# Patient Record
Sex: FEMALE | Race: WHITE | NOT HISPANIC OR LATINO
[De-identification: names, ages, dates, MRNs, and addresses within clinical notes are randomized per-mention and may not be internally consistent; named-entity substitution may affect disease eponyms.]

---

## 2021-01-23 PROBLEM — Z00.00 ENCOUNTER FOR PREVENTIVE HEALTH EXAMINATION: Status: ACTIVE | Noted: 2021-01-23

## 2021-09-16 ENCOUNTER — APPOINTMENT (OUTPATIENT)
Dept: MRI IMAGING | Facility: CLINIC | Age: 76
End: 2021-09-16
Payer: MEDICARE

## 2021-09-16 PROCEDURE — 73721 MRI JNT OF LWR EXTRE W/O DYE: CPT | Mod: RT,MH

## 2021-11-02 ENCOUNTER — OUTPATIENT (OUTPATIENT)
Dept: OUTPATIENT SERVICES | Facility: HOSPITAL | Age: 76
LOS: 1 days | End: 2021-11-02

## 2021-11-13 ENCOUNTER — APPOINTMENT (OUTPATIENT)
Dept: DISASTER EMERGENCY | Facility: CLINIC | Age: 76
End: 2021-11-13

## 2021-11-13 DIAGNOSIS — Z01.818 ENCOUNTER FOR OTHER PREPROCEDURAL EXAMINATION: ICD-10-CM

## 2021-11-14 LAB — SARS-COV-2 N GENE NPH QL NAA+PROBE: NOT DETECTED

## 2021-11-16 ENCOUNTER — INPATIENT (INPATIENT)
Facility: HOSPITAL | Age: 76
LOS: 1 days | Discharge: HOME HEALTH | End: 2021-11-18

## 2021-11-16 ENCOUNTER — OUTPATIENT (OUTPATIENT)
Dept: OUTPATIENT SERVICES | Facility: HOSPITAL | Age: 76
LOS: 1 days | End: 2021-11-16

## 2021-11-17 ENCOUNTER — OUTPATIENT (OUTPATIENT)
Dept: OUTPATIENT SERVICES | Facility: HOSPITAL | Age: 76
LOS: 1 days | End: 2021-11-17

## 2021-11-18 ENCOUNTER — OUTPATIENT (OUTPATIENT)
Dept: OUTPATIENT SERVICES | Facility: HOSPITAL | Age: 76
LOS: 1 days | End: 2021-11-18

## 2022-06-30 ENCOUNTER — APPOINTMENT (OUTPATIENT)
Dept: MRI IMAGING | Facility: CLINIC | Age: 77
End: 2022-06-30

## 2022-06-30 PROCEDURE — 72148 MRI LUMBAR SPINE W/O DYE: CPT | Mod: MH

## 2022-12-14 ENCOUNTER — OFFICE (OUTPATIENT)
Dept: URBAN - METROPOLITAN AREA CLINIC 97 | Facility: CLINIC | Age: 77
Setting detail: OPHTHALMOLOGY
End: 2022-12-14
Payer: MEDICARE

## 2022-12-14 DIAGNOSIS — H18.513: ICD-10-CM

## 2022-12-14 DIAGNOSIS — H01.005: ICD-10-CM

## 2022-12-14 DIAGNOSIS — H35.373: ICD-10-CM

## 2022-12-14 DIAGNOSIS — Z96.1: ICD-10-CM

## 2022-12-14 DIAGNOSIS — H01.001: ICD-10-CM

## 2022-12-14 DIAGNOSIS — H01.004: ICD-10-CM

## 2022-12-14 DIAGNOSIS — H01.002: ICD-10-CM

## 2022-12-14 DIAGNOSIS — H43.393: ICD-10-CM

## 2022-12-14 DIAGNOSIS — H16.223: ICD-10-CM

## 2022-12-14 DIAGNOSIS — H26.493: ICD-10-CM

## 2022-12-14 PROCEDURE — 92014 COMPRE OPH EXAM EST PT 1/>: CPT | Performed by: OPHTHALMOLOGY

## 2022-12-14 PROCEDURE — 92202 OPSCPY EXTND ON/MAC DRAW: CPT | Performed by: OPHTHALMOLOGY

## 2022-12-14 ASSESSMENT — SPHEQUIV_DERIVED
OS_SPHEQUIV: -0.125
OS_SPHEQUIV: -0.125
OD_SPHEQUIV: 0.625
OD_SPHEQUIV: 0.75
OS_SPHEQUIV: 0
OD_SPHEQUIV: 0.375

## 2022-12-14 ASSESSMENT — REFRACTION_MANIFEST
OS_VA1: 20/25
OS_VA2: 20/25(J1)
OD_SPHERE: +0.50
OD_VA2: 20/25(J1)
OD_AXIS: 150
OS_SPHERE: -0.50
OS_CYLINDER: +0.75
OS_ADD: +2.75
OS_CYLINDER: +1.00
OD_CYLINDER: +0.25
OD_ADD: +2.75
OS_AXIS: 025
OD_ADD: +3.00
OU_VA: 20/20
OD_CYLINDER: +0.50
OD_VA1: 20/25
OD_VA1: 20/25
OD_AXIS: 150
OD_VA2: 20/25(J1)
OS_SPHERE: -0.50
OS_VA1: 20/25
OS_VA2: 20/25(J1)
OD_SPHERE: +0.50
OS_ADD: +3.00
OS_AXIS: 025
OU_VA: 20/20

## 2022-12-14 ASSESSMENT — REFRACTION_CURRENTRX
OS_VPRISM_DIRECTION: SV
OS_CYLINDER: +0.25
OD_SPHERE: -0.25
OS_SPHERE: -0.50
OS_SPHERE: +2.75
OS_OVR_VA: 20/
OD_VPRISM_DIRECTION: PROGS
OD_CYLINDER: SPHERE
OD_SPHERE: +2.75
OD_ADD: +2.75
OD_CYLINDER: SPH
OD_OVR_VA: 20/
OS_OVR_VA: 20/
OD_VPRISM_DIRECTION: SV
OS_CYLINDER: SPH
OS_AXIS: 022
OD_OVR_VA: 20/
OS_VPRISM_DIRECTION: PROGS
OS_ADD: +2.75

## 2022-12-14 ASSESSMENT — LID EXAM ASSESSMENTS
OS_COMMENTS: 2+ MEDIAL
OD_COMMENTS: 1+ MEDIAL
OD_BLEPHARITIS: RLL RUL 1+
OD_COMMENTS: 1+ LATERAL
OS_COMMENTS: 2+ LATERAL
OD_FRONTALIS_USE: 1+
OS_BLEPHARITIS: LLL LUL 1+
OS_FRONTALIS_USE: 2+

## 2022-12-14 ASSESSMENT — TONOMETRY
OS_IOP_MMHG: 16
OD_IOP_MMHG: 16

## 2022-12-14 ASSESSMENT — AXIALLENGTH_DERIVED
OS_AL: 23.4124
OD_AL: 23.306
OD_AL: 23.2586
OS_AL: 23.4124
OS_AL: 23.3645
OD_AL: 23.4014

## 2022-12-14 ASSESSMENT — REFRACTION_AUTOREFRACTION
OD_SPHERE: +0.25
OS_SPHERE: -0.50
OS_CYLINDER: +0.75
OS_AXIS: 28
OD_AXIS: 148
OD_CYLINDER: +0.25

## 2022-12-14 ASSESSMENT — LID POSITION - PTOSIS
OS_PTOSIS: LUL T
OD_PTOSIS: RUL T

## 2022-12-14 ASSESSMENT — CONFRONTATIONAL VISUAL FIELD TEST (CVF)
OS_FINDINGS: FULL
OD_FINDINGS: FULL

## 2022-12-14 ASSESSMENT — LID POSITION - DERMATOCHALASIS
OD_DERMATOCHALASIS: RUL T
OS_DERMATOCHALASIS: LUL T

## 2022-12-14 ASSESSMENT — KERATOMETRY
OS_K1POWER_DIOPTERS: 44.00
OS_AXISANGLE_DEGREES: 33
OD_AXISANGLE_DEGREES: 163
OS_K2POWER_DIOPTERS: 44.25
METHOD_AUTO_MANUAL: AUTO
OD_K2POWER_DIOPTERS: 43.75
OD_K1POWER_DIOPTERS: 43.50

## 2022-12-14 ASSESSMENT — PUNCTA - ASSESSMENT
OS_PUNCTA: SIL PLUG
OD_PUNCTA: SIL PLUG

## 2022-12-14 ASSESSMENT — SUPERFICIAL PUNCTATE KERATITIS (SPK)
OD_SPK: 1+
OS_SPK: 1+

## 2022-12-14 ASSESSMENT — VISUAL ACUITY
OS_BCVA: 20/40
OD_BCVA: 20/30

## 2022-12-30 ENCOUNTER — OFFICE (OUTPATIENT)
Dept: URBAN - METROPOLITAN AREA CLINIC 97 | Facility: CLINIC | Age: 77
Setting detail: OPHTHALMOLOGY
End: 2022-12-30
Payer: MEDICARE

## 2022-12-30 ENCOUNTER — RX ONLY (RX ONLY)
Age: 77
End: 2022-12-30

## 2022-12-30 DIAGNOSIS — H26.491: ICD-10-CM

## 2022-12-30 PROCEDURE — 66821 AFTER CATARACT LASER SURGERY: CPT | Performed by: OPHTHALMOLOGY

## 2022-12-30 ASSESSMENT — LID POSITION - DERMATOCHALASIS
OD_DERMATOCHALASIS: RUL T
OS_DERMATOCHALASIS: LUL T

## 2022-12-30 ASSESSMENT — REFRACTION_CURRENTRX
OD_OVR_VA: 20/
OS_CYLINDER: +0.25
OD_SPHERE: +2.75
OS_SPHERE: +2.75
OS_CYLINDER: SPH
OD_CYLINDER: SPHERE
OD_CYLINDER: SPH
OS_ADD: +2.75
OD_ADD: +2.75
OD_OVR_VA: 20/
OS_VPRISM_DIRECTION: PROGS
OS_AXIS: 022
OD_VPRISM_DIRECTION: SV
OD_VPRISM_DIRECTION: PROGS
OS_OVR_VA: 20/
OS_OVR_VA: 20/
OD_SPHERE: -0.25
OS_SPHERE: -0.50
OS_VPRISM_DIRECTION: SV

## 2022-12-30 ASSESSMENT — REFRACTION_MANIFEST
OS_SPHERE: -0.50
OS_VA1: 20/25
OS_CYLINDER: +1.00
OD_CYLINDER: +0.50
OD_SPHERE: +0.50
OU_VA: 20/20
OS_VA1: 20/25
OD_VA2: 20/25(J1)
OS_ADD: +2.75
OD_VA1: 20/25
OS_VA2: 20/25(J1)
OD_ADD: +2.75
OD_SPHERE: +0.50
OD_AXIS: 150
OD_AXIS: 150
OS_SPHERE: -0.50
OD_ADD: +3.00
OD_VA1: 20/25
OU_VA: 20/20
OD_CYLINDER: +0.25
OS_AXIS: 025
OD_VA2: 20/25(J1)
OS_VA2: 20/25(J1)
OS_CYLINDER: +0.75
OS_AXIS: 025
OS_ADD: +3.00

## 2022-12-30 ASSESSMENT — LID EXAM ASSESSMENTS
OD_COMMENTS: 1+ LATERAL
OS_FRONTALIS_USE: 2+
OS_BLEPHARITIS: LLL LUL 1+
OD_BLEPHARITIS: RLL RUL 1+
OD_FRONTALIS_USE: 1+
OS_COMMENTS: 2+ MEDIAL
OD_COMMENTS: 1+ MEDIAL
OS_COMMENTS: 2+ LATERAL

## 2022-12-30 ASSESSMENT — SPHEQUIV_DERIVED
OD_SPHEQUIV: 0.375
OS_SPHEQUIV: -0.125
OD_SPHEQUIV: 0.75
OS_SPHEQUIV: 0
OS_SPHEQUIV: -0.125
OD_SPHEQUIV: 0.625

## 2022-12-30 ASSESSMENT — CONFRONTATIONAL VISUAL FIELD TEST (CVF)
OD_FINDINGS: FULL
OS_FINDINGS: FULL

## 2022-12-30 ASSESSMENT — AXIALLENGTH_DERIVED
OD_AL: 23.2586
OS_AL: 23.4124
OD_AL: 23.4014
OD_AL: 23.306
OS_AL: 23.4124
OS_AL: 23.3645

## 2022-12-30 ASSESSMENT — KERATOMETRY
METHOD_AUTO_MANUAL: AUTO
OD_AXISANGLE_DEGREES: 163
OD_K1POWER_DIOPTERS: 43.50
OD_K2POWER_DIOPTERS: 43.75
OS_K2POWER_DIOPTERS: 44.25
OS_K1POWER_DIOPTERS: 44.00
OS_AXISANGLE_DEGREES: 33

## 2022-12-30 ASSESSMENT — LID POSITION - PTOSIS
OS_PTOSIS: LUL T
OD_PTOSIS: RUL T

## 2022-12-30 ASSESSMENT — REFRACTION_AUTOREFRACTION
OD_CYLINDER: +0.25
OS_AXIS: 28
OS_SPHERE: -0.50
OD_SPHERE: +0.25
OS_CYLINDER: +0.75
OD_AXIS: 148

## 2022-12-30 ASSESSMENT — PUNCTA - ASSESSMENT
OS_PUNCTA: SIL PLUG
OD_PUNCTA: SIL PLUG

## 2022-12-30 ASSESSMENT — SUPERFICIAL PUNCTATE KERATITIS (SPK)
OD_SPK: 1+
OS_SPK: 1+

## 2022-12-30 ASSESSMENT — VISUAL ACUITY
OD_BCVA: 20/30
OS_BCVA: 20/40

## 2023-01-09 ENCOUNTER — RX ONLY (RX ONLY)
Age: 78
End: 2023-01-09

## 2023-01-09 ENCOUNTER — OFFICE (OUTPATIENT)
Dept: URBAN - METROPOLITAN AREA CLINIC 97 | Facility: CLINIC | Age: 78
Setting detail: OPHTHALMOLOGY
End: 2023-01-09
Payer: MEDICARE

## 2023-01-09 DIAGNOSIS — H26.492: ICD-10-CM

## 2023-01-09 PROBLEM — H16.221 DRY EYE SYNDROME K SICCA;  , RIGHT EYE: Status: ACTIVE | Noted: 2022-12-30

## 2023-01-09 PROBLEM — H43.393 VITREOUS FLOATERS; BOTH EYES: Status: ACTIVE | Noted: 2022-12-14

## 2023-01-09 PROBLEM — H01.002 BLEPHARITIS; RIGHT UPPER LID, RIGHT LOWER LID, LEFT UPPER LID, LEFT LOWER LID: Status: ACTIVE | Noted: 2022-12-14

## 2023-01-09 PROBLEM — H01.005 BLEPHARITIS; RIGHT UPPER LID, RIGHT LOWER LID, LEFT UPPER LID, LEFT LOWER LID: Status: ACTIVE | Noted: 2022-12-14

## 2023-01-09 PROBLEM — H01.001 BLEPHARITIS; RIGHT UPPER LID, RIGHT LOWER LID, LEFT UPPER LID, LEFT LOWER LID: Status: ACTIVE | Noted: 2022-12-14

## 2023-01-09 PROBLEM — H01.004 BLEPHARITIS; RIGHT UPPER LID, RIGHT LOWER LID, LEFT UPPER LID, LEFT LOWER LID: Status: ACTIVE | Noted: 2022-12-14

## 2023-01-09 PROCEDURE — 66821 AFTER CATARACT LASER SURGERY: CPT | Performed by: OPHTHALMOLOGY

## 2023-01-09 ASSESSMENT — REFRACTION_CURRENTRX
OD_OVR_VA: 20/
OD_SPHERE: +2.75
OD_VPRISM_DIRECTION: SV
OD_CYLINDER: SPH
OD_OVR_VA: 20/
OD_ADD: +2.75
OS_SPHERE: +2.75
OS_VPRISM_DIRECTION: PROGS
OS_ADD: +2.75
OS_CYLINDER: +0.25
OS_AXIS: 022
OS_SPHERE: -0.50
OD_CYLINDER: SPHERE
OD_SPHERE: -0.25
OS_VPRISM_DIRECTION: SV
OD_VPRISM_DIRECTION: PROGS
OS_CYLINDER: SPH
OS_OVR_VA: 20/
OS_OVR_VA: 20/

## 2023-01-09 ASSESSMENT — REFRACTION_MANIFEST
OS_CYLINDER: +0.75
OS_SPHERE: -0.50
OS_AXIS: 025
OS_AXIS: 025
OD_ADD: +3.00
OD_VA2: 20/25(J1)
OS_VA2: 20/25(J1)
OD_CYLINDER: +0.50
OD_VA1: 20/25
OD_VA2: 20/25(J1)
OD_AXIS: 150
OS_ADD: +3.00
OS_VA1: 20/25
OS_ADD: +2.75
OD_SPHERE: +0.50
OS_VA1: 20/25
OS_SPHERE: -0.50
OD_AXIS: 150
OD_SPHERE: +0.50
OD_CYLINDER: +0.25
OD_ADD: +2.75
OU_VA: 20/20
OS_VA2: 20/25(J1)
OD_VA1: 20/25
OU_VA: 20/20
OS_CYLINDER: +1.00

## 2023-01-09 ASSESSMENT — SPHEQUIV_DERIVED
OD_SPHEQUIV: 0.75
OS_SPHEQUIV: 0
OS_SPHEQUIV: -0.125
OD_SPHEQUIV: 0.375
OS_SPHEQUIV: -0.125
OD_SPHEQUIV: 0.625

## 2023-01-09 ASSESSMENT — REFRACTION_AUTOREFRACTION
OD_CYLINDER: +0.25
OD_AXIS: 148
OD_SPHERE: +0.25
OS_AXIS: 28
OS_SPHERE: -0.50
OS_CYLINDER: +0.75

## 2023-01-09 ASSESSMENT — KERATOMETRY
OS_AXISANGLE_DEGREES: 33
OD_AXISANGLE_DEGREES: 163
OD_K1POWER_DIOPTERS: 43.50
OD_K2POWER_DIOPTERS: 43.75
METHOD_AUTO_MANUAL: AUTO
OS_K1POWER_DIOPTERS: 44.00
OS_K2POWER_DIOPTERS: 44.25

## 2023-01-09 ASSESSMENT — LID EXAM ASSESSMENTS
OD_COMMENTS: 1+ MEDIAL
OS_COMMENTS: 2+ MEDIAL
OD_COMMENTS: 1+ LATERAL
OS_BLEPHARITIS: LLL LUL 1+
OS_COMMENTS: 2+ LATERAL
OD_FRONTALIS_USE: 1+
OD_BLEPHARITIS: RLL RUL 1+
OS_FRONTALIS_USE: 2+

## 2023-01-09 ASSESSMENT — AXIALLENGTH_DERIVED
OD_AL: 23.306
OD_AL: 23.4014
OS_AL: 23.4124
OD_AL: 23.2586
OS_AL: 23.3645
OS_AL: 23.4124

## 2023-01-09 ASSESSMENT — LID POSITION - DERMATOCHALASIS
OS_DERMATOCHALASIS: LUL T
OD_DERMATOCHALASIS: RUL T

## 2023-01-09 ASSESSMENT — LID POSITION - PTOSIS
OD_PTOSIS: RUL T
OS_PTOSIS: LUL T

## 2023-01-09 ASSESSMENT — PUNCTA - ASSESSMENT
OS_PUNCTA: SIL PLUG
OD_PUNCTA: SIL PLUG

## 2023-01-09 ASSESSMENT — VISUAL ACUITY
OD_BCVA: 20/30
OS_BCVA: 20/20

## 2023-01-09 ASSESSMENT — SUPERFICIAL PUNCTATE KERATITIS (SPK)
OD_SPK: 1+
OS_SPK: 1+

## 2023-01-09 ASSESSMENT — CONFRONTATIONAL VISUAL FIELD TEST (CVF)
OS_FINDINGS: FULL
OD_FINDINGS: FULL

## 2023-02-13 ENCOUNTER — OFFICE (OUTPATIENT)
Dept: URBAN - METROPOLITAN AREA CLINIC 97 | Facility: CLINIC | Age: 78
Setting detail: OPHTHALMOLOGY
End: 2023-02-13
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
## 2023-02-13 PROCEDURE — NO SHOW FE NO SHOW FEE: Performed by: OPHTHALMOLOGY

## 2023-03-22 ENCOUNTER — APPOINTMENT (OUTPATIENT)
Dept: RADIOLOGY | Facility: CLINIC | Age: 78
End: 2023-03-22
Payer: MEDICARE

## 2023-03-22 PROCEDURE — 72050 X-RAY EXAM NECK SPINE 4/5VWS: CPT

## 2023-08-10 ENCOUNTER — OFFICE (OUTPATIENT)
Dept: URBAN - METROPOLITAN AREA CLINIC 8 | Facility: CLINIC | Age: 78
Setting detail: OPHTHALMOLOGY
End: 2023-08-10
Payer: MEDICARE

## 2023-08-10 DIAGNOSIS — H16.223: ICD-10-CM

## 2023-08-10 DIAGNOSIS — H01.005: ICD-10-CM

## 2023-08-10 DIAGNOSIS — H01.004: ICD-10-CM

## 2023-08-10 DIAGNOSIS — H01.001: ICD-10-CM

## 2023-08-10 DIAGNOSIS — H01.002: ICD-10-CM

## 2023-08-10 DIAGNOSIS — H10.45: ICD-10-CM

## 2023-08-10 PROCEDURE — 99213 OFFICE O/P EST LOW 20 MIN: CPT | Performed by: OPHTHALMOLOGY

## 2023-08-10 ASSESSMENT — REFRACTION_AUTOREFRACTION
OD_CYLINDER: +0.25
OS_SPHERE: -0.50
OD_SPHERE: +0.25
OS_AXIS: 28
OD_AXIS: 148
OS_CYLINDER: +0.75

## 2023-08-10 ASSESSMENT — KERATOMETRY
OS_AXISANGLE_DEGREES: 33
OS_K1POWER_DIOPTERS: 44.00
OD_AXISANGLE_DEGREES: 163
OD_K1POWER_DIOPTERS: 43.50
METHOD_AUTO_MANUAL: AUTO
OS_K2POWER_DIOPTERS: 44.25
OD_K2POWER_DIOPTERS: 43.75

## 2023-08-10 ASSESSMENT — REFRACTION_MANIFEST
OS_VA2: 20/25(J1)
OS_VA1: 20/25
OS_SPHERE: -0.50
OD_SPHERE: +0.50
OS_AXIS: 025
OS_VA2: 20/25(J1)
OS_ADD: +2.75
OS_AXIS: 025
OD_VA2: 20/25(J1)
OD_AXIS: 150
OS_VA1: 20/25
OD_ADD: +3.00
OD_VA1: 20/25
OD_VA2: 20/25(J1)
OD_CYLINDER: +0.50
OS_CYLINDER: +0.75
OD_CYLINDER: +0.25
OS_SPHERE: -0.50
OD_VA1: 20/25
OD_ADD: +2.75
OD_AXIS: 150
OU_VA: 20/20
OS_CYLINDER: +1.00
OS_ADD: +3.00
OD_SPHERE: +0.50
OU_VA: 20/20

## 2023-08-10 ASSESSMENT — AXIALLENGTH_DERIVED
OS_AL: 23.3645
OD_AL: 23.306
OS_AL: 23.4124
OD_AL: 23.2586
OD_AL: 23.4014
OS_AL: 23.4124

## 2023-08-10 ASSESSMENT — TONOMETRY
OD_IOP_MMHG: 16
OS_IOP_MMHG: 14

## 2023-08-10 ASSESSMENT — REFRACTION_CURRENTRX
OD_CYLINDER: SPH
OS_OVR_VA: 20/
OD_SPHERE: +2.75
OS_VPRISM_DIRECTION: SV
OS_SPHERE: +2.75
OS_OVR_VA: 20/
OD_CYLINDER: SPHERE
OD_SPHERE: -0.25
OS_VPRISM_DIRECTION: PROGS
OD_OVR_VA: 20/
OS_SPHERE: -0.50
OS_ADD: +2.75
OS_CYLINDER: +0.25
OS_AXIS: 022
OD_VPRISM_DIRECTION: SV
OD_VPRISM_DIRECTION: PROGS
OS_CYLINDER: SPH
OD_ADD: +2.75
OD_OVR_VA: 20/

## 2023-08-10 ASSESSMENT — VISUAL ACUITY
OS_BCVA: 20/25
OD_BCVA: 20/30

## 2023-08-10 ASSESSMENT — LID EXAM ASSESSMENTS
OS_BLEPHARITIS: LLL LUL 1+
OD_COMMENTS: 1+ LATERAL
OD_FRONTALIS_USE: 1+
OD_COMMENTS: 1+ MEDIAL
OS_COMMENTS: 2+ MEDIAL
OD_BLEPHARITIS: RLL RUL 1+
OS_COMMENTS: 2+ LATERAL
OS_FRONTALIS_USE: 2+

## 2023-08-10 ASSESSMENT — SPHEQUIV_DERIVED
OS_SPHEQUIV: -0.125
OS_SPHEQUIV: -0.125
OS_SPHEQUIV: 0
OD_SPHEQUIV: 0.625
OD_SPHEQUIV: 0.75
OD_SPHEQUIV: 0.375

## 2023-08-10 ASSESSMENT — CONFRONTATIONAL VISUAL FIELD TEST (CVF)
OS_FINDINGS: FULL
OD_FINDINGS: FULL

## 2023-08-10 ASSESSMENT — LID POSITION - DERMATOCHALASIS
OS_DERMATOCHALASIS: LUL T
OD_DERMATOCHALASIS: RUL T

## 2023-08-10 ASSESSMENT — LID POSITION - PTOSIS
OS_PTOSIS: LUL T
OD_PTOSIS: RUL T

## 2023-09-11 ENCOUNTER — OFFICE (OUTPATIENT)
Dept: URBAN - METROPOLITAN AREA CLINIC 97 | Facility: CLINIC | Age: 78
Setting detail: OPHTHALMOLOGY
End: 2023-09-11
Payer: MEDICARE

## 2023-09-11 DIAGNOSIS — H01.001: ICD-10-CM

## 2023-09-11 DIAGNOSIS — H16.223: ICD-10-CM

## 2023-09-11 DIAGNOSIS — H10.45: ICD-10-CM

## 2023-09-11 DIAGNOSIS — H01.004: ICD-10-CM

## 2023-09-11 DIAGNOSIS — H01.002: ICD-10-CM

## 2023-09-11 DIAGNOSIS — H01.005: ICD-10-CM

## 2023-09-11 PROCEDURE — 99213 OFFICE O/P EST LOW 20 MIN: CPT | Performed by: OPHTHALMOLOGY

## 2023-09-11 ASSESSMENT — REFRACTION_CURRENTRX
OD_CYLINDER: SPH
OS_ADD: +2.75
OS_VPRISM_DIRECTION: SV
OS_VPRISM_DIRECTION: PROGS
OS_CYLINDER: +0.25
OD_OVR_VA: 20/
OS_SPHERE: +2.75
OD_VPRISM_DIRECTION: SV
OS_CYLINDER: SPH
OD_ADD: +2.75
OD_VPRISM_DIRECTION: PROGS
OD_SPHERE: -0.25
OD_SPHERE: +2.75
OS_AXIS: 022
OD_OVR_VA: 20/
OS_SPHERE: -0.50
OD_CYLINDER: SPHERE
OS_OVR_VA: 20/
OS_OVR_VA: 20/

## 2023-09-11 ASSESSMENT — REFRACTION_MANIFEST
OD_SPHERE: +0.50
OD_CYLINDER: +0.50
OD_CYLINDER: +0.25
OS_SPHERE: -0.50
OD_AXIS: 150
OD_ADD: +3.00
OS_VA2: 20/25(J1)
OD_VA2: 20/25(J1)
OS_CYLINDER: +1.00
OU_VA: 20/20
OD_SPHERE: +0.50
OS_AXIS: 025
OD_AXIS: 150
OD_VA2: 20/25(J1)
OU_VA: 20/20
OS_VA2: 20/25(J1)
OS_AXIS: 025
OS_VA1: 20/25
OS_CYLINDER: +0.75
OS_ADD: +2.75
OS_ADD: +3.00
OD_ADD: +2.75
OD_VA1: 20/25
OS_SPHERE: -0.50
OD_VA1: 20/25
OS_VA1: 20/25

## 2023-09-11 ASSESSMENT — AXIALLENGTH_DERIVED
OS_AL: 23.4124
OS_AL: 23.4124
OD_AL: 23.4014
OD_AL: 23.306
OS_AL: 23.3645
OD_AL: 23.2586

## 2023-09-11 ASSESSMENT — REFRACTION_AUTOREFRACTION
OS_CYLINDER: +0.75
OD_CYLINDER: +0.25
OS_AXIS: 28
OD_SPHERE: +0.25
OD_AXIS: 148
OS_SPHERE: -0.50

## 2023-09-11 ASSESSMENT — SPHEQUIV_DERIVED
OD_SPHEQUIV: 0.75
OS_SPHEQUIV: -0.125
OD_SPHEQUIV: 0.375
OS_SPHEQUIV: -0.125
OS_SPHEQUIV: 0
OD_SPHEQUIV: 0.625

## 2023-09-11 ASSESSMENT — LID POSITION - DERMATOCHALASIS
OS_DERMATOCHALASIS: LUL T
OD_DERMATOCHALASIS: RUL T

## 2023-09-11 ASSESSMENT — LID POSITION - PTOSIS
OS_PTOSIS: LUL T
OD_PTOSIS: RUL T

## 2023-09-11 ASSESSMENT — CONFRONTATIONAL VISUAL FIELD TEST (CVF)
OS_FINDINGS: FULL
OD_FINDINGS: FULL

## 2023-09-11 ASSESSMENT — LID EXAM ASSESSMENTS
OD_BLEPHARITIS: RLL RUL 1+
OS_FRONTALIS_USE: 2+
OS_COMMENTS: 2+ MEDIAL
OD_FRONTALIS_USE: 1+
OD_COMMENTS: 1+ MEDIAL
OD_COMMENTS: 1+ LATERAL
OS_BLEPHARITIS: LLL LUL 1+
OS_COMMENTS: 2+ LATERAL

## 2023-09-11 ASSESSMENT — TONOMETRY
OD_IOP_MMHG: 12
OS_IOP_MMHG: 12

## 2023-09-11 ASSESSMENT — KERATOMETRY
OD_K2POWER_DIOPTERS: 43.75
OD_K1POWER_DIOPTERS: 43.50
OS_K1POWER_DIOPTERS: 44.00
METHOD_AUTO_MANUAL: AUTO
OD_AXISANGLE_DEGREES: 163
OS_K2POWER_DIOPTERS: 44.25
OS_AXISANGLE_DEGREES: 33

## 2023-09-11 ASSESSMENT — VISUAL ACUITY
OS_BCVA: 20/25-2
OD_BCVA: 20/25-

## 2023-09-21 ASSESSMENT — KOOS JR
KOOS JR RAW SCORE: 15
HOW SEVERE IS YOUR KNEE STIFFNESS AFTER FIRST WAKING IN MORNING: SEVERE
HOW SEVERE IS YOUR KNEE STIFFNESS AFTER FIRST WAKING IN MORNING: SEVERE
IMPORTED KOOS JR SCORE: 15.0
GOING UP OR DOWN STAIRS: MODERATE
IMPORTED FORM: YES
RISING FROM SITTING: MODERATE
IMPORTED KOOS JR SCORE: 15.0
TWISING OR PIVOTING ON KNEE: MODERATE
STANDING UPRIGHT: MILD
BENDING TO THE FLOOR TO PICK UP OBJECT: SEVERE
STRAIGHTENING KNEE FULLY: MODERATE
STANDING UPRIGHT: MILD
TWISING OR PIVOTING ON KNEE: MODERATE
GOING UP OR DOWN STAIRS: MODERATE
KOOS JR RAW SCORE: 15
IMPORTED FORM: YES
STRAIGHTENING KNEE FULLY: MODERATE
RISING FROM SITTING: MODERATE
BENDING TO THE FLOOR TO PICK UP OBJECT: SEVERE

## 2023-11-13 ENCOUNTER — APPOINTMENT (OUTPATIENT)
Dept: MRI IMAGING | Facility: CLINIC | Age: 78
End: 2023-11-13
Payer: MEDICARE

## 2023-11-13 PROCEDURE — 72148 MRI LUMBAR SPINE W/O DYE: CPT | Mod: MH

## 2024-01-04 ENCOUNTER — APPOINTMENT (OUTPATIENT)
Dept: MRI IMAGING | Facility: CLINIC | Age: 79
End: 2024-01-04
Payer: MEDICARE

## 2024-01-04 PROCEDURE — 73718 MRI LOWER EXTREMITY W/O DYE: CPT | Mod: RT,MH

## 2024-01-25 ENCOUNTER — RESULT REVIEW (OUTPATIENT)
Age: 79
End: 2024-01-25

## 2024-01-25 ENCOUNTER — APPOINTMENT (OUTPATIENT)
Dept: VASCULAR SURGERY | Facility: CLINIC | Age: 79
End: 2024-01-25
Payer: MEDICARE

## 2024-01-25 VITALS — SYSTOLIC BLOOD PRESSURE: 126 MMHG | DIASTOLIC BLOOD PRESSURE: 78 MMHG | HEART RATE: 66 BPM | OXYGEN SATURATION: 99 %

## 2024-01-25 DIAGNOSIS — M35.3 POLYMYALGIA RHEUMATICA: ICD-10-CM

## 2024-01-25 DIAGNOSIS — E78.5 HYPERLIPIDEMIA, UNSPECIFIED: ICD-10-CM

## 2024-01-25 DIAGNOSIS — M79.2 NEURALGIA AND NEURITIS, UNSPECIFIED: ICD-10-CM

## 2024-01-25 DIAGNOSIS — I77.89 OTHER SPECIFIED DISORDERS OF ARTERIES AND ARTERIOLES: ICD-10-CM

## 2024-01-25 PROCEDURE — 99203 OFFICE O/P NEW LOW 30 MIN: CPT

## 2024-01-25 RX ORDER — ROSUVASTATIN CALCIUM 10 MG/1
10 TABLET, FILM COATED ORAL
Refills: 0 | Status: ACTIVE | COMMUNITY

## 2024-01-26 PROBLEM — I77.89 AV MALFORMATION, ACQUIRED: Status: ACTIVE | Noted: 2024-01-26

## 2024-02-01 ENCOUNTER — APPOINTMENT (OUTPATIENT)
Dept: CT IMAGING | Facility: CLINIC | Age: 79
End: 2024-02-01
Payer: MEDICARE

## 2024-02-01 PROCEDURE — 75635 CT ANGIO ABDOMINAL ARTERIES: CPT | Mod: MH

## 2024-02-12 ENCOUNTER — APPOINTMENT (OUTPATIENT)
Dept: OBGYN | Facility: CLINIC | Age: 79
End: 2024-02-12
Payer: COMMERCIAL

## 2024-02-12 PROCEDURE — 97810 ACUP 1/> WO ESTIM 1ST 15 MIN: CPT

## 2024-02-15 ENCOUNTER — APPOINTMENT (OUTPATIENT)
Dept: VASCULAR SURGERY | Facility: CLINIC | Age: 79
End: 2024-02-15
Payer: MEDICARE

## 2024-02-15 VITALS
BODY MASS INDEX: 19.55 KG/M2 | WEIGHT: 132 LBS | HEART RATE: 55 BPM | RESPIRATION RATE: 14 BRPM | DIASTOLIC BLOOD PRESSURE: 76 MMHG | HEIGHT: 69 IN | TEMPERATURE: 97.9 F | OXYGEN SATURATION: 99 % | SYSTOLIC BLOOD PRESSURE: 124 MMHG

## 2024-02-15 DIAGNOSIS — M67.471 GANGLION, RIGHT ANKLE AND FOOT: ICD-10-CM

## 2024-02-15 PROCEDURE — 99203 OFFICE O/P NEW LOW 30 MIN: CPT

## 2024-02-16 PROBLEM — M67.471 GANGLION OF FOOT, RIGHT: Status: ACTIVE | Noted: 2024-02-16

## 2024-02-16 NOTE — PROCEDURE
[FreeTextEntry1] : 1/4/24 MRI right foot: 1. There is a T2 hyperintense structure at the level of the mid third and fourth metatarsals measuring up to 1.8 x 1.7 x 3.8 cm likely representing vascular dilatation/malformation and less likely ganglion. 2. 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  1/25/24 CTA RLE: Insinuated between plantar RIGHT 3rd-4th metatarsals is  tubular cystic lesion without enhancement/vascularity and therefore most c/w extra-articular ganglion and not vascular dilatation/malformation.

## 2024-02-16 NOTE — HISTORY OF PRESENT ILLNESS
[FreeTextEntry1] : 1/25/24: 79 yo female PMHx HLD, polymyalgia rheumatica, presents for evaluation of right foot pain. Pt has had right foot pain for many years. She was a marathon runner in the past. She feels over the past year she has had increased pain in her right foot. She denies any leg swelling or claudication.   2/16/24: Pt here today to discuss plan for treatment for right foot ganglion. She states she has some nerve pain at her right lateral foot. She has numbness in her right 3rd toe. She tried acupuncture for her lower back pain and had some relief.

## 2024-02-16 NOTE — ASSESSMENT
[FreeTextEntry1] : 79 yo female with right foot ganglion and pain.   Pt counseled on results of CTA and above diagnosis. Plan for aspiration of ganglion cyst in office next week   A total of 30 minutes was spent with patient and coordinating care

## 2024-02-16 NOTE — PHYSICAL EXAM
[2+] : left 2+ [Ankle Swelling (On Exam)] : not present [Varicose Veins Of Lower Extremities] : not present [] : not present [de-identified] : NAD. well appearing  [FreeTextEntry1] : tender to palpation of right foot between 3rd and 4th metatarsal

## 2024-02-29 NOTE — PROCEDURE
[Time Out Completed] : Patient's name, date of birth, procedure and correct site confirmed [Written consent] : and a written consent was obtained prior to the procedure and is detailed in the patient's record [Attending] : Attending [Alcohol] : alcohol [Supine] : Supine [Both] : Laterality: both [Right] : Laterality: right [No complications] : No complications [de-identified] : 1. Hot/cold/fever/chills: 2. Sweat: 3. Head/face: 4. Pain (OLDCARTS): 5. Urine/stool: 6. Thirst/appetitie/taste: 7. Sleep: 8. Thorax/abdomen: 9. Gynecology: 10. History: R knee replacement, dust allergies,  General: Pulse (R): Pulse (L): Tongue: [FreeTextEntry1] : 9 [FreeTextEntry2] : 9 [FreeTextEntry3] : Entered the room: 11:00, initial intake 45min, palpation and needle insertion 20min. Exited the room: 12:05, needle retention 20min. Entered the room: 12:30, needles removed.  Total time spent in the room with the PT: 70min. PT accepted and tolerated the needles well and was able to retain them for the entire treatment.  This was the PT first acupuncture experience, and a conservative approach was adopted both in number of needles, location and intensity of manipulation. After follow-up and assessment of their reaction to the treatment a more aggressive approach may be adopted at subsequent encounters.  The provider used single use, disposable, stainless steel, filiform needles that he inserted at critical points on the body determined based on the patient's chief complaint and in accordance with the theories and principles of Traditional Chinese Medicine (TCM) acupuncture practice. The provider located the points according to TCM point location and by palpating for anatomical landmarks. After locating the point, the location is cleaned with an individually packaged, single use, alcohol swab, performed in a single, circular motion on the skin. The provider selected the appropriate needle lengths based on the individual point to be needled, then carefully inserted the needles, and mildly stimulated the needle by various techniques such as rotating the needle or inserting and withdrawing the needle repeatedly depending upon the affect desired, until the sensation of 'de qi' is achieved according to patient report. After all needles have been inserted, repeating the above protocol for each point, he then instructed the patient to rest for a period of time with the needles still inserted. He returned periodically to check on the patient and readjust the needles as necessary. When the provider sees the desired effect is achieved, he removed and disposed of the needles in the sharp's container, applying pressure on the points with a cotton ball to prevent bruising or bleeding. He provided the patient with post procedure instructions, and then charts the procedure. He does not include the patient resting time as part of the time calculation for this service, but only the time he actually spends with the patient.

## 2024-03-05 ENCOUNTER — APPOINTMENT (OUTPATIENT)
Dept: OBGYN | Facility: CLINIC | Age: 79
End: 2024-03-05
Payer: COMMERCIAL

## 2024-03-05 PROCEDURE — 97810 ACUP 1/> WO ESTIM 1ST 15 MIN: CPT

## 2024-03-05 PROCEDURE — 97811 ACUP 1/> W/O ESTIM EA ADD 15: CPT

## 2024-03-20 NOTE — PLAN
[FreeTextEntry1] : Treatment Principles: Break stasis, move local qi and blood, relieve pain, restore function. Smooth LV qi, supplement KD yin, tonify KD draper.

## 2024-03-20 NOTE — ASSESSMENT
[TextEntry] : This is local qi and blood stasis of the associated area, with underlying LV qi and blood stasis and KD yin and draper vacuity contributing to the condition.
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
## 2024-03-20 NOTE — REASON FOR VISIT
[New Patient] : [unfilled] is a new patient [TextEntry] : 79y/o, female, RPA, presents with a recurrent chief complaint of R foot pain and neuropathy.

## 2024-03-26 ENCOUNTER — APPOINTMENT (OUTPATIENT)
Dept: OBGYN | Facility: CLINIC | Age: 79
End: 2024-03-26
Payer: COMMERCIAL

## 2024-03-26 PROCEDURE — 97810 ACUP 1/> WO ESTIM 1ST 15 MIN: CPT

## 2024-03-26 PROCEDURE — 97811 ACUP 1/> W/O ESTIM EA ADD 15: CPT

## 2024-03-27 NOTE — REASON FOR VISIT
[New Patient] : [unfilled] is a new patient [Established Patient] : [unfilled] is an established patient [TextEntry] : 77y/o, female, NPA, presents with a recurrent chief complaint of R foot pain and neuropathy.

## 2024-03-27 NOTE — PROCEDURE
[Time Out Completed] : Patient's name, date of birth, procedure and correct site confirmed [Written consent] : and a written consent was obtained prior to the procedure and is detailed in the patient's record [Attending] : Attending [Pain] : pain [Alcohol] : alcohol [Lateral Recumbent] : Lateral Recumbent [Right] : Laterality: right [Other: ___ mins] : [unfilled] mins [No complications] : No complications [FreeTextEntry4] : PT presents with recurrent R foot pain with marked improvement since previous acupuncture encounter,with fewer episodes of shooting pain and less intensity of those that occur. Low back pain previously reported is markedly improved, Onset of R foot pain is 17y with exacerbation over the past 2 yrs, Pain and neuropathy felt in the lat aspect of the foot along UB to metatarsals 2-4-5, areas feels hot to PT touch, pain is 'nervy' with some pins and needles, aggravated by the pressure of wearing shoes and standing for long periods. PT is very active, walking up to 2miles/d, sometimes resulting in 'electirc shock' feeling in the associated area(s). PT reports a ganglian cyst on the dorsal aspect of the foot that may need to be aspirated, being treated with topicals.  [FreeTextEntry6] : R foot [FreeTextEntry7] : nerve like pain, 'electric', feeling of pins and needles [de-identified] : Pulse (R): ext wiry, sl rapid Pulse (L): ext wiry, sl rapid Tongue: red body, red tip, tooth marked, quivering, deep center crack and pronounced bilateral sm cracks covering the tongue body, very dry thin white coat, wetter edges, distended sublingual veins [FreeTextEntry1] : 16 [FreeTextEntry3] : Entered the room: 3:30, initial intake 15min, palpation and needle insertion 20min. Exited the room: 4:05, needle retention 20min. Entered the room: 4:25, needles removed.  Total time spent in the room with the PT: 40min. PT accepted and tolerated the needles well and was able to retain them for the entire treatment.  PT reported no AE with previous acupuncture treatment and good relief of symptoms, so a more comprehensive protocol was adopted at the time of encounter.    The provider used single use, disposable, stainless steel, filiform needles that he inserted at critical points on the body determined based on the patient's chief complaint and in accordance with the theories and principles of Traditional Chinese Medicine (TCM) acupuncture practice. The provider located the points according to TCM point location and by palpating for anatomical landmarks. After locating the point, the location is cleaned with an individually packaged, single use, alcohol swab, performed in a single, circular motion on the skin. The provider selected the appropriate needle lengths based on the individual point to be needled, then carefully inserted the needles, and mildly stimulated the needle by various techniques such as rotating the needle or inserting and withdrawing the needle repeatedly depending upon the affect desired, until the sensation of 'de qi' is achieved according to patient report. After all needles have been inserted, repeating the above protocol for each point, he then instructed the patient to rest for a period of time with the needles still inserted. He returned periodically to check on the patient and readjust the needles as necessary. When the provider sees the desired effect is achieved, he removed and disposed of the needles in the sharp's container, applying pressure on the points with a cotton ball to prevent bruising or bleeding. He provided the patient with post procedure instructions, and then charts the procedure. He does not include the patient resting time as part of the time calculation for this service, but only the time he actually spends with the patient. [FreeTextEntry2] : 16

## 2024-04-16 ENCOUNTER — APPOINTMENT (OUTPATIENT)
Dept: OBGYN | Facility: CLINIC | Age: 79
End: 2024-04-16
Payer: COMMERCIAL

## 2024-04-16 PROCEDURE — 97811 ACUP 1/> W/O ESTIM EA ADD 15: CPT

## 2024-04-16 PROCEDURE — 97810 ACUP 1/> WO ESTIM 1ST 15 MIN: CPT

## 2024-04-16 NOTE — REASON FOR VISIT
[Established Patient] : [unfilled] is an established patient [TextEntry] : 77y/o, female, NPA, presents with a recurrent chief complaint of R foot pain and neuropathy.

## 2024-04-16 NOTE — PROCEDURE
[Time Out Completed] : Patient's name, date of birth, procedure and correct site confirmed [Written consent] : and a written consent was obtained prior to the procedure and is detailed in the patient's record [Attending] : Attending [Pain] : pain [Alcohol] : alcohol [Lateral Recumbent] : Lateral Recumbent [Right] : Laterality: right [Other: ___ mins] : [unfilled] mins [No complications] : No complications [FreeTextEntry4] : PT presents with recurrent R foot pain with marked improvement since previous acupuncture encounter,with fewer episodes of shooting pain, but the intensity remains the same. Low back pain previously reported is markedly improved with only a dull achy pain, predominantly R sided.  [FreeTextEntry6] : R foot [FreeTextEntry7] : nerve like pain, 'electric', feeling of pins and needles [de-identified] : Pulse (R): ext wiry, sl rapid Pulse (L): ext wiry, sl rapid Tongue: red body, red tip, tooth marked, quivering, deep center crack and pronounced bilateral sm cracks covering the tongue body, very dry thin white coat, wetter edges, distended sublingual veins [FreeTextEntry1] : 16 [FreeTextEntry2] : 16 [FreeTextEntry3] : Entered the room: 10:30, initial intake 15min, palpation and needle insertion 20min. Exited the room: 11:05, needle retention 20min. Entered the room: 11:25, needles removed.  Total time spent in the room with the PT: 40min. PT accepted and tolerated the needles well and was able to retain them for the entire treatment.  PT reported no AE with previous acupuncture treatment and good relief of symptoms, so a similar protocol was adopted at the time of encounter.    The provider used single use, disposable, stainless steel, filiform needles that he inserted at critical points on the body determined based on the patient's chief complaint and in accordance with the theories and principles of Traditional Chinese Medicine (TCM) acupuncture practice. The provider located the points according to TCM point location and by palpating for anatomical landmarks. After locating the point, the location is cleaned with an individually packaged, single use, alcohol swab, performed in a single, circular motion on the skin. The provider selected the appropriate needle lengths based on the individual point to be needled, then carefully inserted the needles, and mildly stimulated the needle by various techniques such as rotating the needle or inserting and withdrawing the needle repeatedly depending upon the affect desired, until the sensation of 'de qi' is achieved according to patient report. After all needles have been inserted, repeating the above protocol for each point, he then instructed the patient to rest for a period of time with the needles still inserted. He returned periodically to check on the patient and readjust the needles as necessary. When the provider sees the desired effect is achieved, he removed and disposed of the needles in the sharp's container, applying pressure on the points with a cotton ball to prevent bruising or bleeding. He provided the patient with post procedure instructions, and then charts the procedure. He does not include the patient resting time as part of the time calculation for this service, but only the time he actually spends with the patient.

## 2024-05-08 ENCOUNTER — APPOINTMENT (OUTPATIENT)
Age: 79
End: 2024-05-08

## 2024-05-28 ENCOUNTER — APPOINTMENT (OUTPATIENT)
Age: 79
End: 2024-05-28

## 2024-05-29 ENCOUNTER — OFFICE (OUTPATIENT)
Dept: URBAN - METROPOLITAN AREA CLINIC 97 | Facility: CLINIC | Age: 79
Setting detail: OPHTHALMOLOGY
End: 2024-05-29
Payer: MEDICARE

## 2024-05-29 DIAGNOSIS — H01.002: ICD-10-CM

## 2024-05-29 DIAGNOSIS — H18.513: ICD-10-CM

## 2024-05-29 DIAGNOSIS — H01.001: ICD-10-CM

## 2024-05-29 DIAGNOSIS — H10.45: ICD-10-CM

## 2024-05-29 DIAGNOSIS — H01.005: ICD-10-CM

## 2024-05-29 DIAGNOSIS — H16.223: ICD-10-CM

## 2024-05-29 DIAGNOSIS — H01.004: ICD-10-CM

## 2024-05-29 DIAGNOSIS — H35.373: ICD-10-CM

## 2024-05-29 DIAGNOSIS — H43.393: ICD-10-CM

## 2024-05-29 DIAGNOSIS — Z96.1: ICD-10-CM

## 2024-05-29 PROCEDURE — 92134 CPTRZ OPH DX IMG PST SGM RTA: CPT | Performed by: OPHTHALMOLOGY

## 2024-05-29 PROCEDURE — 92014 COMPRE OPH EXAM EST PT 1/>: CPT | Performed by: OPHTHALMOLOGY

## 2024-05-29 PROCEDURE — 92015 DETERMINE REFRACTIVE STATE: CPT | Performed by: OPHTHALMOLOGY

## 2024-05-29 ASSESSMENT — LID POSITION - PTOSIS
OS_PTOSIS: LUL T
OD_PTOSIS: RUL T

## 2024-05-29 ASSESSMENT — LID EXAM ASSESSMENTS
OS_FRONTALIS_USE: 2+
OS_COMMENTS: 2+ LATERAL
OD_COMMENTS: 1+ LATERAL
OD_FRONTALIS_USE: 1+
OS_COMMENTS: 2+ MEDIAL
OS_BLEPHARITIS: LLL LUL 1+
OD_BLEPHARITIS: RLL RUL 1+
OD_COMMENTS: 1+ MEDIAL

## 2024-05-29 ASSESSMENT — CONFRONTATIONAL VISUAL FIELD TEST (CVF)
OS_FINDINGS: FULL
OD_FINDINGS: FULL

## 2024-05-29 ASSESSMENT — LID POSITION - DERMATOCHALASIS
OS_DERMATOCHALASIS: LUL T
OD_DERMATOCHALASIS: RUL T

## 2024-06-18 ENCOUNTER — APPOINTMENT (OUTPATIENT)
Age: 79
End: 2024-06-18

## 2024-06-18 DIAGNOSIS — G89.29 LOW BACK PAIN, UNSPECIFIED: ICD-10-CM

## 2024-06-18 DIAGNOSIS — M79.671 PAIN IN RIGHT FOOT: ICD-10-CM

## 2024-06-18 DIAGNOSIS — M25.512 PAIN IN LEFT SHOULDER: ICD-10-CM

## 2024-06-18 DIAGNOSIS — M54.50 LOW BACK PAIN, UNSPECIFIED: ICD-10-CM

## 2024-06-18 PROCEDURE — 97810 ACUP 1/> WO ESTIM 1ST 15 MIN: CPT

## 2024-06-18 PROCEDURE — 97811 ACUP 1/> W/O ESTIM EA ADD 15: CPT

## 2024-06-18 NOTE — PROCEDURE
[Time Out Completed] : Patient's name, date of birth, procedure and correct site confirmed [Written consent] : and a written consent was obtained prior to the procedure and is detailed in the patient's record [Attending] : Attending [___ Week(s)] : [unfilled] week(s) ago [Pain] : pain [___ Year(s)] : [unfilled] year(s) ago [Alcohol] : alcohol [Prone] : Prone [Both] : Laterality: both [Other: ___ mins] : [unfilled] mins [No complications] : No complications [FreeTextEntry4] : PT presents with multiple areas of discomfort. Previously reported foot pain of the lat aspect is near total resolution, pain in the foot in limited to the base of the 3-4 metatarsals and is dull, achy, aggravated by foot flexion and walking long distances or standing for ext periods. L sided shoulder pain is acute, onset is 1wk, PT believes caused by lifting something incorrectly, the pain is felt in the scap, trap and suboccipital musculature, is dull, achy with impeded shoulder abduction, elevation and upward rotation causing an increase in pain. PT reports low back pain, onset is many year, located across the belt line with some raditating pain in the bilateral glute musculature and moving inferiorly into the post aspect of the legs, stopping short of the knee, pain is achy, dull with a feeling of pain moving outward from the SI some weakness on lumbar flexion, aggravated by laying prone, with PT on waking, will sometimes wake her from sleep and force a change of position. PT reports similar feelings of numbness on the lat aspect of R foot as previously reported that has persisted since adolescents but no other related neuropathy.  [FreeTextEntry6] : L shoulder   [FreeTextEntry9] : low back   [FreeTextEntry7] : dull, achy [FreeTextEntry8] : dull, achy  [de-identified] : R foot [de-identified] : nervy, dull, numbness [de-identified] : Pulse (R): ext wiry, sl rapid Pulse (L): ext wiry, sl rapid Tongue: red body, red tip, tooth marked, quivering, deep center crack and pronounced bilateral sm cracks covering the tongue body, very dry thin white coat, wetter edges, distended sublingual veins. [FreeTextEntry1] : 14 [FreeTextEntry2] : 14 [FreeTextEntry3] : Entered the room: 11:00, initial intake 20min, palpation and needle insertion 25min. Exited the room: 11:45, needle retention 20min. Entered the room: 12:05, needles removed.  Total time spent in the room with the PT: 45min. PT accepted and tolerated the needles well and was able to retain them for the entire treatment.  The provider used single use, disposable, stainless steel, filiform needles that he inserted at critical points on the body determined based on the patient's chief complaint and in accordance with the theories and principles of Traditional Chinese Medicine (TCM) acupuncture practice. The provider located the points according to TCM point location and by palpating for anatomical landmarks. After locating the point, the location is cleaned with an individually packaged, single use, alcohol swab, performed in a single, circular motion on the skin. The provider selected the appropriate needle lengths based on the individual point to be needled, then carefully inserted the needles, and mildly stimulated the needle by various techniques such as rotating the needle or inserting and withdrawing the needle repeatedly depending upon the affect desired, until the sensation of 'de qi' is achieved according to patient report. After all needles have been inserted, repeating the above protocol for each point, he then instructed the patient to rest for a period of time with the needles still inserted. He returned periodically to check on the patient and readjust the needles as necessary. When the provider sees the desired effect is achieved, he removed and disposed of the needles in the sharp's container, applying pressure on the points with a cotton ball to prevent bruising or bleeding. He provided the patient with post procedure instructions, and then charts the procedure. He does not include the patient resting time as part of the time calculation for this service, but only the time he actually spends with the patient.

## 2024-06-18 NOTE — ASSESSMENT
[TextEntry] : This is local qi and blood stasis of the associated area(s), with underlying LV qi and blood stasis and KD yin and draper vacuity contributing to the condition.

## 2024-06-18 NOTE — REASON FOR VISIT
[Established Patient] : [unfilled] is an established patient [TextEntry] : PT presents with a chief complaint(s) of L shoulder pain, low back pain, R foot pain.

## 2024-07-23 ENCOUNTER — APPOINTMENT (OUTPATIENT)
Age: 79
End: 2024-07-23

## 2024-07-23 DIAGNOSIS — M54.50 LOW BACK PAIN, UNSPECIFIED: ICD-10-CM

## 2024-07-23 DIAGNOSIS — G89.29 LOW BACK PAIN, UNSPECIFIED: ICD-10-CM

## 2024-07-23 PROCEDURE — 97810 ACUP 1/> WO ESTIM 1ST 15 MIN: CPT

## 2024-07-23 PROCEDURE — 97811 ACUP 1/> W/O ESTIM EA ADD 15: CPT

## 2024-07-23 NOTE — PROCEDURE
[Time Out Completed] : Patient's name, date of birth, procedure and correct site confirmed [Written consent] : and a written consent was obtained prior to the procedure and is detailed in the patient's record [Attending] : Attending [Pain] : pain [___ Week(s)] : [unfilled] week(s) ago [Alcohol] : alcohol [Prone] : Prone [Both] : Laterality: both [Other: ___ mins] : [unfilled] mins [No complications] : No complications [FreeTextEntry4] : PT presents with exacerbated state of previously reported chronic low back pain, onset is 2wks, located bilateral along the belt line and inf into bilateral glute musculature, pain is dull, achy, with increased stiffness in the AM, 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 PT denies sciatic like pain during this episode, and no neuropathy of BLE. Previously reported R foot pain has greatly resolved, still with a feeling of bruising in the web of 4-5, but lat aspect pain is manageable.  [FreeTextEntry6] : bilateral low back   [FreeTextEntry7] : dull, achy, stiff [de-identified] : Refer to initial intake on 02/12/24 for assessment and 10 questions. Pulse (R): ext wiry, sl rapid Pulse (L): ext wiry, sl rapid Tongue: red body, red tip, tooth marked, quivering, deep center crack and pronounced bilateral sm cracks covering the tongue body, very dry thin white coat, wetter edges, distended sublingual veins. [FreeTextEntry1] : 11 [FreeTextEntry2] : 11 [FreeTextEntry3] : Entered the room: 2:15, initial intake 15min, palpation and needle insertion 20min. Exited the room: 2:50, needle retention 20min. Entered the room: 3:10, needles removed.  Total time spent in the room with the PT: 35min. PT accepted and tolerated the needles well and was able to retain them for the entire treatment.  The provider used single use, disposable, stainless steel, filiform needles that he inserted at critical points on the body determined based on the patient's chief complaint and in accordance with the theories and principles of Traditional Chinese Medicine (TCM) acupuncture practice. The provider located the points according to TCM point location and by palpating for anatomical landmarks. After locating the point, the location is cleaned with an individually packaged, single use, alcohol swab, performed in a single, circular motion on the skin. The provider selected the appropriate needle lengths based on the individual point to be needled, then carefully inserted the needles, and mildly stimulated the needle by various techniques such as rotating the needle or inserting and withdrawing the needle repeatedly depending upon the affect desired, until the sensation of 'de qi' is achieved according to patient report. After all needles have been inserted, repeating the above protocol for each point, he then instructed the patient to rest for a period of time with the needles still inserted. He returned periodically to check on the patient and readjust the needles as necessary. When the provider sees the desired effect is achieved, he removed and disposed of the needles in the sharp's container, applying pressure on the points with a cotton ball to prevent bruising or bleeding. He provided the patient with post procedure instructions, and then charts the procedure. He does not include the patient resting time as part of the time calculation for this service, but only the time he actually spends with the patient.

## 2024-07-23 NOTE — REASON FOR VISIT
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

## 2024-10-15 ENCOUNTER — APPOINTMENT (OUTPATIENT)
Age: 79
End: 2024-10-15

## 2024-10-15 DIAGNOSIS — G89.29 LOW BACK PAIN, UNSPECIFIED: ICD-10-CM

## 2024-10-15 DIAGNOSIS — M54.50 LOW BACK PAIN, UNSPECIFIED: ICD-10-CM

## 2024-10-15 PROCEDURE — 97810 ACUP 1/> WO ESTIM 1ST 15 MIN: CPT

## 2024-10-15 PROCEDURE — 97811 ACUP 1/> W/O ESTIM EA ADD 15: CPT

## 2024-12-04 ENCOUNTER — OFFICE (OUTPATIENT)
Dept: URBAN - METROPOLITAN AREA CLINIC 97 | Facility: CLINIC | Age: 79
Setting detail: OPHTHALMOLOGY
End: 2024-12-04
Payer: MEDICARE

## 2024-12-04 DIAGNOSIS — H01.001: ICD-10-CM

## 2024-12-04 DIAGNOSIS — H16.223: ICD-10-CM

## 2024-12-04 DIAGNOSIS — H40.013: ICD-10-CM

## 2024-12-04 DIAGNOSIS — H18.513: ICD-10-CM

## 2024-12-04 DIAGNOSIS — H10.45: ICD-10-CM

## 2024-12-04 DIAGNOSIS — Z96.1: ICD-10-CM

## 2024-12-04 DIAGNOSIS — H01.004: ICD-10-CM

## 2024-12-04 PROCEDURE — 92133 CPTRZD OPH DX IMG PST SGM ON: CPT | Performed by: OPHTHALMOLOGY

## 2024-12-04 PROCEDURE — 99213 OFFICE O/P EST LOW 20 MIN: CPT | Performed by: OPHTHALMOLOGY

## 2024-12-04 PROCEDURE — 76514 ECHO EXAM OF EYE THICKNESS: CPT | Performed by: OPHTHALMOLOGY

## 2024-12-04 ASSESSMENT — LID EXAM ASSESSMENTS
OS_BLEPHARITIS: LLL LUL 1+
OD_BLEPHARITIS: RLL RUL 1+
OS_COMMENTS: 2+ MEDIAL
OD_COMMENTS: 1+ MEDIAL
OD_COMMENTS: 1+ LATERAL
OD_FRONTALIS_USE: 1+
OS_COMMENTS: 2+ LATERAL
OS_FRONTALIS_USE: 2+

## 2024-12-04 ASSESSMENT — REFRACTION_MANIFEST
OS_ADD: +2.75
OD_SPHERE: -0.25
OD_CYLINDER: +0.25
OS_VA1: 20/20-2
OS_VA1: 20/20-2
OS_CYLINDER: +1.00
OD_AXIS: 095
OD_VA2: 20/20(J1+)
OD_VA1: 20/20-2
OD_ADD: +2.75
OU_VA: 20/20-2
OS_AXIS: 030
OS_VA2: 20/20(J1+)
OD_VA2: 20/20(J1+)
OS_AXIS: 030
OD_ADD: +2.75
OS_ADD: +2.75
OD_CYLINDER: +0.25
OS_VA2: 20/20(J1+)
OS_SPHERE: -0.50
OD_SPHERE: -0.25
OD_AXIS: 095
OS_SPHERE: -0.50
OD_VA1: 20/20-2
OS_CYLINDER: +1.00
OU_VA: 20/20-2

## 2024-12-04 ASSESSMENT — REFRACTION_CURRENTRX
OS_AXIS: 020
OS_SPHERE: -0.50
OD_SPHERE: -0.25
OD_ADD: +2.50
OS_OVR_VA: 20/
OD_OVR_VA: 20/
OD_VPRISM_DIRECTION: SV
OD_VPRISM_DIRECTION: PROGS
OD_OVR_VA: 20/
OD_SPHERE: +2.75
OS_ADD: +2.50
OD_CYLINDER: SPHERE
OS_VPRISM_DIRECTION: PROGS
OS_CYLINDER: +0.75
OD_CYLINDER: SPH
OS_SPHERE: +2.75
OS_OVR_VA: 20/
OS_VPRISM_DIRECTION: SV
OS_CYLINDER: SPH

## 2024-12-04 ASSESSMENT — LID POSITION - PTOSIS
OD_PTOSIS: RUL T
OS_PTOSIS: LUL T

## 2024-12-04 ASSESSMENT — REFRACTION_AUTOREFRACTION
OS_SPHERE: -0.75
OD_CYLINDER: +0.50
OD_SPHERE: -0.25
OS_AXIS: 018
OD_AXIS: 094
OS_CYLINDER: +0.75

## 2024-12-04 ASSESSMENT — PACHYMETRY
OS_CT_CORRECTION: 4
OS_CT_UM: 496
OD_CT_UM: 499
OD_CT_CORRECTION: 4

## 2024-12-04 ASSESSMENT — LID POSITION - DERMATOCHALASIS
OD_DERMATOCHALASIS: RUL T
OS_DERMATOCHALASIS: LUL T

## 2024-12-04 ASSESSMENT — KERATOMETRY
OS_K1POWER_DIOPTERS: 44.00
METHOD_AUTO_MANUAL: AUTO
OS_K2POWER_DIOPTERS: 44.50
OD_K1POWER_DIOPTERS: 43.75
OS_AXISANGLE_DEGREES: 024
OD_K2POWER_DIOPTERS: 44.00
OD_AXISANGLE_DEGREES: 177

## 2024-12-04 ASSESSMENT — CONFRONTATIONAL VISUAL FIELD TEST (CVF)
OD_FINDINGS: FULL
OS_FINDINGS: FULL

## 2024-12-04 ASSESSMENT — VISUAL ACUITY
OS_BCVA: 20/20
OD_BCVA: 20/25-1